# Patient Record
Sex: MALE | Race: WHITE
[De-identification: names, ages, dates, MRNs, and addresses within clinical notes are randomized per-mention and may not be internally consistent; named-entity substitution may affect disease eponyms.]

---

## 2021-12-15 ENCOUNTER — HOSPITAL ENCOUNTER (OUTPATIENT)
Dept: HOSPITAL 35 - CAT | Age: 51
End: 2021-12-15
Attending: SPECIALIST
Payer: COMMERCIAL

## 2021-12-15 DIAGNOSIS — M43.17: ICD-10-CM

## 2021-12-15 DIAGNOSIS — M48.07: ICD-10-CM

## 2021-12-15 DIAGNOSIS — M47.816: Primary | ICD-10-CM

## 2021-12-15 DIAGNOSIS — M53.2X6: ICD-10-CM

## 2022-01-31 ENCOUNTER — HOSPITAL ENCOUNTER (OUTPATIENT)
Dept: HOSPITAL 35 - PAC | Age: 52
End: 2022-01-31
Attending: SPECIALIST
Payer: COMMERCIAL

## 2022-01-31 DIAGNOSIS — Z01.812: Primary | ICD-10-CM

## 2022-01-31 DIAGNOSIS — M48.07: ICD-10-CM

## 2022-01-31 DIAGNOSIS — M43.17: ICD-10-CM

## 2022-01-31 DIAGNOSIS — Z01.810: ICD-10-CM

## 2022-01-31 LAB
ALBUMIN SERPL-MCNC: 4.2 G/DL (ref 3.4–5)
ALT SERPL-CCNC: 27 U/L (ref 16–63)
ANION GAP SERPL CALC-SCNC: 12 MMOL/L (ref 7–16)
APTT BLD: 27.3 SECONDS (ref 24.5–32.8)
AST SERPL-CCNC: 16 U/L (ref 15–37)
BILIRUB SERPL-MCNC: 0.6 MG/DL (ref 0.2–1)
BILIRUB UR-MCNC: NEGATIVE MG/DL
BUN SERPL-MCNC: 11 MG/DL (ref 7–18)
CALCIUM SERPL-MCNC: 9.3 MG/DL (ref 8.5–10.1)
CHLORIDE SERPL-SCNC: 105 MMOL/L (ref 98–107)
CO2 SERPL-SCNC: 25 MMOL/L (ref 21–32)
COLOR UR: YELLOW
CREAT SERPL-MCNC: 0.9 MG/DL (ref 0.7–1.3)
ERYTHROCYTE [DISTWIDTH] IN BLOOD BY AUTOMATED COUNT: 15.1 % (ref 10.5–14.5)
GLUCOSE SERPL-MCNC: 92 MG/DL (ref 74–106)
HCT VFR BLD CALC: 47.1 % (ref 42–52)
HGB BLD-MCNC: 15.6 GM/DL (ref 14–18)
INR PPP: 1
KETONES UR STRIP-MCNC: NEGATIVE MG/DL
MCH RBC QN AUTO: 29 PG (ref 26–34)
MCHC RBC AUTO-ENTMCNC: 33.1 G/DL (ref 28–37)
MCV RBC: 87.6 FL (ref 80–100)
PLATELET # BLD: 212 THOU/UL (ref 150–400)
POTASSIUM SERPL-SCNC: 4.2 MMOL/L (ref 3.5–5.1)
PROT SERPL-MCNC: 7 G/DL (ref 6.4–8.2)
PROTHROMBIN TIME: 10.9 SECONDS (ref 10.5–12.1)
RBC # BLD AUTO: 5.38 MIL/UL (ref 4.5–6)
RBC # UR STRIP: NEGATIVE /UL
SODIUM SERPL-SCNC: 142 MMOL/L (ref 136–145)
SP GR UR STRIP: <= 1.005 (ref 1–1.03)
URINE CLARITY: CLEAR
URINE GLUCOSE-RANDOM*: NEGATIVE
URINE LEUKOCYTES-REFLEX: NEGATIVE
URINE NITRITE-REFLEX: NEGATIVE
URINE PROTEIN (DIPSTICK): NEGATIVE
UROBILINOGEN UR STRIP-ACNC: 0.2 E.U./DL (ref 0.2–1)
WBC # BLD AUTO: 4.8 THOU/UL (ref 4–11)

## 2022-01-31 NOTE — EKG
[address] [place]  [address]
Phone:  [phone]                    ELECTROCARDIOGRAM REPORT      
_______________________________________________________________________________
 
Name:       MELISSA HOFFMANN              [facility]                     [facility]      [id number]     Account #:      [id number]  
Admission:  22    Attend Phys:    Rolan Jones, 
Discharge:              Date of Birth:  [birth year]  
                                                          Report #: [id number]
   [id number]
_______________________________________________________________________________
                          [facility]
                                       
Test Date:    2022               Test Time:    08:41:31
Pat Name:     MELISSA HOFFMANN           Department:   
Patient ID:   [id number]            Room:          
Gender:                               Technician:   KARUNA CHEN
:          1970               Requested By: Rolan Jones
Order Number: [id number] MD:   Bertram Hidalgo
                                 Measurements
Intervals                              Axis          
Rate:         75                       P:            74
PA:           181                      QRS:          72
QRSD:         97                       T:            50
QT:           413                                    
QTc:          462                                    
                           Interpretive Statements
Sinus rhythm
Probable left atrial enlargement
RSR' in V1 or V2, right VCD or RVH
No previous ECG available for comparison
Electronically Signed On 2022 15:15:00 CST by Bertram Hidalgo
[email or web address]
 
 
 
 
 
 
 
 
 
 
 
 
 
 
 
 
 
 
 
 
 
  <ELECTRONICALLY SIGNED>
   By: Bertram Hidalgo MD, [facility]    
  22     1515
D: 22 08                           _____________________________________
T: 22                           Bertram Hidalgo MD, FACC      /EPI

## 2022-02-14 ENCOUNTER — HOSPITAL ENCOUNTER (INPATIENT)
Dept: HOSPITAL 35 - PRE | Age: 52
LOS: 2 days | Discharge: HOME | DRG: 460 | End: 2022-02-16
Attending: SPECIALIST | Admitting: SPECIALIST
Payer: COMMERCIAL

## 2022-02-14 VITALS — SYSTOLIC BLOOD PRESSURE: 145 MMHG | DIASTOLIC BLOOD PRESSURE: 90 MMHG

## 2022-02-14 VITALS — SYSTOLIC BLOOD PRESSURE: 141 MMHG | DIASTOLIC BLOOD PRESSURE: 91 MMHG

## 2022-02-14 VITALS — HEIGHT: 75.98 IN | WEIGHT: 215 LBS | BODY MASS INDEX: 26.18 KG/M2

## 2022-02-14 DIAGNOSIS — Z79.899: ICD-10-CM

## 2022-02-14 DIAGNOSIS — Z80.0: ICD-10-CM

## 2022-02-14 DIAGNOSIS — K58.9: ICD-10-CM

## 2022-02-14 DIAGNOSIS — M19.90: ICD-10-CM

## 2022-02-14 DIAGNOSIS — Z88.1: ICD-10-CM

## 2022-02-14 DIAGNOSIS — M43.17: Primary | ICD-10-CM

## 2022-02-14 DIAGNOSIS — M48.07: ICD-10-CM

## 2022-02-14 DIAGNOSIS — Z88.2: ICD-10-CM

## 2022-02-14 DIAGNOSIS — Z87.442: ICD-10-CM

## 2022-02-14 DIAGNOSIS — Z28.21: ICD-10-CM

## 2022-02-14 DIAGNOSIS — Z82.49: ICD-10-CM

## 2022-02-14 DIAGNOSIS — K21.9: ICD-10-CM

## 2022-02-14 DIAGNOSIS — Z88.0: ICD-10-CM

## 2022-02-14 DIAGNOSIS — G89.29: ICD-10-CM

## 2022-02-14 DIAGNOSIS — Z80.1: ICD-10-CM

## 2022-02-14 DIAGNOSIS — M47.817: ICD-10-CM

## 2022-02-14 DIAGNOSIS — F41.1: ICD-10-CM

## 2022-02-14 PROCEDURE — 59202: CPT

## 2022-02-14 PROCEDURE — 56525: CPT

## 2022-02-14 PROCEDURE — 50850: CPT

## 2022-02-14 PROCEDURE — 57103: CPT

## 2022-02-14 PROCEDURE — 58759: CPT

## 2022-02-14 PROCEDURE — 62110: CPT

## 2022-02-14 PROCEDURE — 58556: CPT

## 2022-02-14 PROCEDURE — 10102: CPT

## 2022-02-14 PROCEDURE — 50923: CPT

## 2022-02-14 PROCEDURE — 56532: CPT

## 2022-02-14 PROCEDURE — 50010 RENAL EXPLORATION: CPT

## 2022-02-14 PROCEDURE — 58745: CPT

## 2022-02-14 PROCEDURE — 58457: CPT

## 2022-02-14 PROCEDURE — 52258: CPT

## 2022-02-14 PROCEDURE — 01NB0ZZ RELEASE LUMBAR NERVE, OPEN APPROACH: ICD-10-PCS | Performed by: SPECIALIST

## 2022-02-14 PROCEDURE — 56528: CPT

## 2022-02-14 PROCEDURE — 58782: CPT

## 2022-02-14 PROCEDURE — 58547: CPT

## 2022-02-14 PROCEDURE — 58544 LSH W/T/O UTERUS ABOVE 250 G: CPT

## 2022-02-14 PROCEDURE — 0SG3071 FUSION OF LUMBOSACRAL JOINT WITH AUTOLOGOUS TISSUE SUBSTITUTE, POSTERIOR APPROACH, POSTERIOR COLUMN, OPEN APPROACH: ICD-10-PCS | Performed by: SPECIALIST

## 2022-02-14 PROCEDURE — 58567: CPT

## 2022-02-14 PROCEDURE — 50331: CPT

## 2022-02-14 PROCEDURE — 51878: CPT

## 2022-02-14 PROCEDURE — 50402: CPT

## 2022-02-14 PROCEDURE — 01NR0ZZ RELEASE SACRAL NERVE, OPEN APPROACH: ICD-10-PCS | Performed by: SPECIALIST

## 2022-02-14 PROCEDURE — 62900: CPT

## 2022-02-14 PROCEDURE — 58545 LAPAROSCOPIC MYOMECTOMY: CPT

## 2022-02-14 PROCEDURE — 58546 LAPARO-MYOMECTOMY COMPLEX: CPT

## 2022-02-14 PROCEDURE — 70005: CPT

## 2022-02-14 PROCEDURE — 50101: CPT

## 2022-02-15 VITALS — SYSTOLIC BLOOD PRESSURE: 109 MMHG | DIASTOLIC BLOOD PRESSURE: 76 MMHG

## 2022-02-15 VITALS — DIASTOLIC BLOOD PRESSURE: 71 MMHG | SYSTOLIC BLOOD PRESSURE: 118 MMHG

## 2022-02-15 VITALS — DIASTOLIC BLOOD PRESSURE: 75 MMHG | SYSTOLIC BLOOD PRESSURE: 118 MMHG

## 2022-02-15 VITALS — SYSTOLIC BLOOD PRESSURE: 116 MMHG | DIASTOLIC BLOOD PRESSURE: 72 MMHG

## 2022-02-15 NOTE — NUR
RECEIVED CARE OF THIS PATIENT AT 1944 FROM [facility] AREA VIA BED ACCOMPANIED
HOLDING PERSONEL AND WIFE.  PATIENT ALERT AND ORIENTED X4.  HAS PCA OF MS BUT
STATES IS MAKING HIM SICK.  HAS HAD A TOTAL OF 4 EPISODES OF EMISIS.  MED
GIVEN WHEN ABLE.  MS PCA DC'D AND CHANGED TO DILUADID.  HAD A COUPLE OF
EPISODES OF ANXIETY.  MED WAS GIVEN FOR THIS.  UNABLE TO VIEW DRESSING ON BACK
AS ANY MOVEMENT MADE PATIENT NAUSEATED.  EDUCATED PATIENT ON DEEP BREATHING
AND COUGHING AS WELL AS MOVING LEGS AND FEET WHEN EVER POSSIBLE.  SLEPT VERY
LITTLE NIGHT SHIFT.

## 2022-02-16 VITALS — DIASTOLIC BLOOD PRESSURE: 94 MMHG | SYSTOLIC BLOOD PRESSURE: 149 MMHG

## 2022-02-16 VITALS — DIASTOLIC BLOOD PRESSURE: 73 MMHG | SYSTOLIC BLOOD PRESSURE: 113 MMHG

## 2022-02-16 VITALS — SYSTOLIC BLOOD PRESSURE: 113 MMHG | DIASTOLIC BLOOD PRESSURE: 73 MMHG

## 2022-02-16 VITALS — SYSTOLIC BLOOD PRESSURE: 149 MMHG | DIASTOLIC BLOOD PRESSURE: 94 MMHG

## 2022-02-16 NOTE — NUR
RECEIVED CARE OF THIS PATIENT AT 1900.  PATIENT ALERT AND ORIENTED X4.  UP
WITH ASSIST.  HAS FOLY PATENT YELLOW URINE.  DRESSING ON BACK D/I.  MARIANNE ON
BACK.  C/O PAIN, MED GIVEN.  SLEPT MOST OF NIGHT.